# Patient Record
Sex: MALE | Race: OTHER | HISPANIC OR LATINO | URBAN - METROPOLITAN AREA
[De-identification: names, ages, dates, MRNs, and addresses within clinical notes are randomized per-mention and may not be internally consistent; named-entity substitution may affect disease eponyms.]

---

## 2024-05-15 ENCOUNTER — EMERGENCY (EMERGENCY)
Facility: HOSPITAL | Age: 39
LOS: 1 days | Discharge: HOME | End: 2024-05-15
Attending: EMERGENCY MEDICINE
Payer: SELF-PAY

## 2024-05-15 VITALS
TEMPERATURE: 98 F | OXYGEN SATURATION: 95 % | HEIGHT: 65 IN | SYSTOLIC BLOOD PRESSURE: 123 MMHG | WEIGHT: 231.93 LBS | DIASTOLIC BLOOD PRESSURE: 69 MMHG | HEART RATE: 73 BPM | RESPIRATION RATE: 17 BRPM

## 2024-05-15 PROCEDURE — T1013: CPT

## 2024-05-15 PROCEDURE — 16020 DRESS/DEBRID P-THICK BURN S: CPT | Mod: RT

## 2024-05-15 PROCEDURE — 99285 EMERGENCY DEPT VISIT HI MDM: CPT | Mod: 25

## 2024-05-15 PROCEDURE — 99284 EMERGENCY DEPT VISIT MOD MDM: CPT

## 2024-05-15 RX ORDER — CEPHALEXIN 500 MG
1 CAPSULE ORAL
Qty: 28 | Refills: 0
Start: 2024-05-15 | End: 2024-05-21

## 2024-05-15 RX ORDER — CEPHALEXIN 500 MG
500 CAPSULE ORAL ONCE
Refills: 0 | Status: COMPLETED | OUTPATIENT
Start: 2024-05-15 | End: 2024-05-15

## 2024-05-15 RX ADMIN — Medication 500 MILLIGRAM(S): at 18:35
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
## 2024-05-15 NOTE — ED PROVIDER NOTE - NSFOLLOWUPCLINICS_GEN_ALL_ED_FT
[facility]  Burn  [address] - [address] [place] [address]  Phone: [phone]  Fax: [phone]  Follow Up Time: Urgent    [facility]  Burn  [address] [place] [address]  Phone: [phone]  Fax:   Follow Up Time: Urgent

## 2024-05-15 NOTE — ED PROVIDER NOTE - PATIENT PORTAL LINK FT
You can access the FollowMyHealth Patient Portal offered by [facility] by registering at the following website: [email or web address]. By joining [facility]’s FollowMyHealth portal, you will also be able to view your health information using other applications (apps) compatible with our system.
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
## 2024-05-15 NOTE — ED ADULT TRIAGE NOTE - NS ED TRIAGE AVPU SCALE
87
Alert-The patient is alert, awake and responds to voice. The patient is oriented to time, place, and person. The triage nurse is able to obtain subjective information.
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
